# Patient Record
Sex: FEMALE | Race: BLACK OR AFRICAN AMERICAN
[De-identification: names, ages, dates, MRNs, and addresses within clinical notes are randomized per-mention and may not be internally consistent; named-entity substitution may affect disease eponyms.]

---

## 2017-04-15 ENCOUNTER — HOSPITAL ENCOUNTER (EMERGENCY)
Dept: HOSPITAL 25 - ED | Age: 57
Discharge: HOME | End: 2017-04-15
Payer: COMMERCIAL

## 2017-04-15 VITALS — SYSTOLIC BLOOD PRESSURE: 136 MMHG | DIASTOLIC BLOOD PRESSURE: 76 MMHG

## 2017-04-15 DIAGNOSIS — Z87.891: ICD-10-CM

## 2017-04-15 DIAGNOSIS — N39.0: Primary | ICD-10-CM

## 2017-04-15 PROCEDURE — 87186 SC STD MICRODIL/AGAR DIL: CPT

## 2017-04-15 PROCEDURE — 99282 EMERGENCY DEPT VISIT SF MDM: CPT

## 2017-04-15 PROCEDURE — 81015 MICROSCOPIC EXAM OF URINE: CPT

## 2017-04-15 PROCEDURE — 36415 COLL VENOUS BLD VENIPUNCTURE: CPT

## 2017-04-15 PROCEDURE — 87077 CULTURE AEROBIC IDENTIFY: CPT

## 2017-04-15 PROCEDURE — 87086 URINE CULTURE/COLONY COUNT: CPT

## 2017-04-15 PROCEDURE — 81003 URINALYSIS AUTO W/O SCOPE: CPT

## 2017-04-15 PROCEDURE — 86703 HIV-1/HIV-2 1 RESULT ANTBDY: CPT

## 2017-04-15 NOTE — ED
Mu GATES Janilya, scribed for Riley Morales MD on 04/15/17 at 1650 .





GI/ HPI





- HPI Summary


HPI Summary: 


[facility] 57 [facility] w/ a gradual onset of constant UTI-

like Sx starting this morning. Pt reports hematuria, urinary urgency, and 

urinary burning. Nothing makes the pain better or worse. Pt denies abd pain. Pt 

states she has been drinking recently on her birthday celebration.





- History of Current Complaint


Chief Complaint: EDUrogenitalProblems


Time Seen by Provider: 04/15/17 16:33


Stated Complaint: BLOOD IN URINE, DISCOMFORT


Hx Obtained From: Patient


Onset/Duration: Started Hours Ago, Atraumatic, Still Present


Timing: Constant, Lasting Hours


Severity: Moderate


Current Severity: Moderate


Vaginal Bleeding Description: Dark Red


Pain Intensity: 0


Pain Characteristics: Burning


Aggravating Factor(s): Nothing


Alleviating Factor(s): Nothing





- Allergy/Home Medications


Allergies/Adverse Reactions: 


 Allergies











Allergy/AdvReac Type Severity Reaction Status Date / Time


 


No Known Allergies Allergy   Verified 04/15/17 16:14














PMH/Surg Hx/FS Hx/Imm Hx


Previously Healthy: Yes


EENT History: 


   Denies: Hx Deafness


Infectious Disease History: No


Infectious Disease History: 


   Denies: Traveled Outside the [place] in Last 30 Days





- Family History


Known Family History: Positive: Hypertension





- Social History


Alcohol Use: Occasionally


Substance Use Type: Reports: None


Smoking Status (MU): Former Smoker





Review of Systems


Positive: burning, hematuria, urgency


All Other Systems Reviewed And Are Negative: Yes





Physical Exam





- Summary


Physical Exam Summary: 





VITAL SIGNS: Reviewed. 


GENERAL: Patient is a well developed and nourished female who is lying 

comfortable in the stretcher. Patient is not in any acute respiratory distress. 


HEAD AND FACE: Normocephalic and atraumatic. 


EYES: PERRLA, EOMI x 2, No injected conjunctiva.


EARS: Hearing grossly intact. Ear canals and tympanic membranes are WNL.


MOUTH: Oropharynx within normal limits. 


NECK: Supple, trachea is midline, no adenopathy, no JVD.


CHEST: Symmetric, no tenderness at palpation 


LUNGS: Clear to auscultation bilaterally. No wheezing or crackles.


CVS: RRR,, S1 and S2 present, no murmurs or gallops appreciated. 


ABDOMEN: Soft, non-tender. No signs of distention. Positive bowel sounds. No 

rebound no guarding, and no masses palpated. No abdominal bruit or pulsations. 


EXTREMITIES: FROM in all major joints, no edema, no cyanosis or clubbing.


NEURO: Alert and oriented x 3. No acute neurological deficits. Speech is normal.


SKIN: Dry and warm





Triage Information Reviewed: Yes


Vital Signs On Initial Exam: 


 Initial Vitals











Temp Pulse Resp BP Pulse Ox


 


 97.7 F   62   15   142/79   100 


 


 04/15/17 15:00  04/15/17 15:00  04/15/17 15:00  04/15/17 15:00  04/15/17 15:00











Vital Signs Reviewed: Yes





Diagnostics





- Vital Signs


 Vital Signs











  Temp Pulse Resp BP Pulse Ox


 


 04/15/17 16:10  98 F  68  16  139/80  98


 


 04/15/17 15:00  97.7 F  62  15  142/79  100














- Laboratory


Lab Results: 


 Lab Results











  04/15/17 Range/Units





  16:15 


 


Urine Color  Red A  


 


Urine Appearance  Cloudy  


 


Urine pH  5.0  (5-9)  


 


Ur Specific Gravity  1.028  (1.010-1.030)  


 


Urine Protein  2+(100 mg/dl) H  (Negative)  


 


Urine Ketones  Negative  (Negative)  


 


Urine Blood  3+ H  (Negative)  


 


Urine Nitrate  Negative  (Negative)  


 


Urine Bilirubin  Negative  (Negative)  


 


Urine Urobilinogen  Negative  (Negative)  


 


Ur Leukocyte Esterase  2+ H  (Negative)  


 


Urine WBC (Auto)  3+(>20/hpf) H  (Absent)  


 


Urine RBC (Auto)  3+(>10/hpf) H  (Absent)  


 


Ur Squamous Epith Cells  Present H  (Absent)  


 


Urine Bacteria  Absent  (Absent)  


 


Urine Glucose  Negative  (Negative)  











Lab Statement: Any lab studies that have been ordered have been reviewed, and 

results considered in the medical decision making process.





GIGU Course/Dx





- Course


Assessment/Plan: A 57 [facility] w/ a gradual onset 

of constant UTI-like Sx starting this morning. Pt reports hematuria, urinary 

urgency, and urinary burning. Nothing makes the pain better or worse. Pt denies 

abd pain. Pt states she has been drinking recently on her birthday celebration.

  Abnormal urinalysis results.  urine color - red A.  urine protein 2+(100 mg/dl

) H.  urine blood 3+ H.  urine leukocyte esterase 2+ H.  urine WBC 3+(>20/hpf) 

H.  urine RBC 3+(>10/hpf) H.  In the ED course, pt continues to have burning 

urination and hematuria. Thus, I do believe that pt has UTI. Urinalysis shows 

contamination. But we will send urine for culture. Pt will be given Cipro for 

UTI.  Pt is AxOx3 and hemodynamically stable. Pt will be discharged home w/ f/u 

w/ PCP in 2 days.  I discussed all the findings and test results with the 

patient. Patient was instructed to return to the emergency room immediately if 

any of the symptoms return or worsens. Plan of care was discussed with the 

patient and understands and agrees. All questions were answered at patient 

satisfaction.  There were no further complaints or concerns.  Lung exam before 

discharge: CTA B/L. Good air exchange. No wheezing or crackles heard. CVS: S1 

and S2 present. No murmurs appreciated. Patient is alert and oriented x 3. 

Patient is hemodynamically stable. Patient will be discharged home with follow 

up PCP in the next 2-3 days





- Diagnoses


Differential Diagnoses - Female: Urinary Tract Infection, Other


Provider Diagnoses: 


 UTI








Discharge





- Discharge Plan


Condition: Stable


Disposition: HOME


Prescriptions: 


Ciprofloxacin TAB* [Cipro 500 MG TAB*] 500 mg PO BID #6 tab


Patient Education Materials:  Urinary Tract Infection in Women (ED), Dysuria (ED

)


Referrals: 


No Primary Care Phys,NOPCP [Primary Care Provider] - 





The documentation as recorded by the Mu lloyd Janilya accurately 

reflects the service I personally performed and the decisions made by me, Riley Morales MD.

## 2017-04-17 NOTE — PN
Progress Note





- Progress Note


Note: 


Patient placed on cipro for UTI. urine culture grew 25-50,000. will wait for 

final culture.

## 2017-04-18 NOTE — PN
Progress Note





- Progress Note


Note: 


patient placed on cipro which final culture shows is sensitive too. No further 

action needed

## 2017-08-24 ENCOUNTER — HOSPITAL ENCOUNTER (EMERGENCY)
Dept: HOSPITAL 25 - ED | Age: 57
Discharge: HOME | End: 2017-08-24
Payer: COMMERCIAL

## 2017-08-24 VITALS — DIASTOLIC BLOOD PRESSURE: 90 MMHG | SYSTOLIC BLOOD PRESSURE: 171 MMHG

## 2017-08-24 DIAGNOSIS — Z87.891: ICD-10-CM

## 2017-08-24 DIAGNOSIS — R10.9: ICD-10-CM

## 2017-08-24 DIAGNOSIS — M46.1: ICD-10-CM

## 2017-08-24 DIAGNOSIS — M54.9: Primary | ICD-10-CM

## 2017-08-24 PROCEDURE — 81003 URINALYSIS AUTO W/O SCOPE: CPT

## 2017-08-24 PROCEDURE — 81015 MICROSCOPIC EXAM OF URINE: CPT

## 2017-08-24 PROCEDURE — 99282 EMERGENCY DEPT VISIT SF MDM: CPT

## 2017-08-24 PROCEDURE — 87086 URINE CULTURE/COLONY COUNT: CPT

## 2017-08-24 PROCEDURE — 72192 CT PELVIS W/O DYE: CPT

## 2017-08-24 NOTE — ED
Joao GATES Angela, scribed for Gerson Echeverria MD on 08/24/17 at 0953 .





Back Pain





- HPI Summary


HPI Summary: 





This pt is a 58 y[facility]/o constant right sided back 

pain x2 days. Pt reports her pain is aggravated with movement, standing up, 

sitting down for too long, and bending down. She has not taken any pain 

medication. Pt denies abd pain, chest pain, SOB, nausea, vomiting, diarrhea, 

numbness or tingling in lower extremity. She notes she spoke with her nurse and 

was told to come to the ED. Pt reports a PMHx: left sided back injury. 





- History of Current Complaint


Chief Complaint: EDFlankPain


Stated Complaint: RT FLANK PAIN


Time Seen by Provider: 08/24/17 09:36


Hx Obtained From: Patient


Onset/Duration: Lasting Days


Pain Intensity: 9


Pain Scale Used: 0-10 Numeric


Aggravating Symptom(s): Movement


Alleviating Symptom(s): Position


Associated Signs And Symptoms: Positive: Abdominal Pain - discomfort.  Negative

: Weakness, Numbness, Tingling





- Allergies/Home Medications


Allergies/Adverse Reactions: 


 Allergies











Allergy/AdvReac Type Severity Reaction Status Date / Time


 


No Known Allergies Allergy   Verified 08/24/17 09:59














PMH/Surg Hx/FS Hx/Imm Hx


Endocrine/Hematology History: 


   Denies: Hx Diabetes


Sensory History: 


   Denies: Hx Deafness


Infectious Disease History: 


   Denies: Traveled Outside the [place] in Last 30 Days





- Family History


Known Family History: Positive: Hypertension





- Social History


Alcohol Use: Occasionally


Substance Use Type: Reports: None


Smoking Status (MU): Former Smoker





Review of Systems


Negative: Fever, Chills


Negative: Abdominal Pain, Vomiting, Diarrhea, Nausea


Negative: dysuria, hematuria


Positive: Other - Right sided back pain


Negative: Weakness, Numbness


All Other Systems Reviewed And Are Negative: Yes





Physical Exam


Triage Information Reviewed: Yes


Vital Signs On Initial Exam: 


 Initial Vitals











Temp Pulse Resp BP Pulse Ox


 


 97.6 F   61   20   165/95   99 


 


 08/24/17 09:28  08/24/17 09:28  08/24/17 09:28  08/24/17 09:28  08/24/17 09:28











Vital Signs Reviewed: Yes


Appearance: Positive: Well-Appearing, Pain Distress - mild


Skin: Positive: Warm, Skin Color Reflects Adequate Perfusion, Dry


Head/Face: Positive: Normal Head/Face Inspection


Eyes: Positive: Normal


ENT: Positive: Normal ENT inspection


Neck: Positive: Supple, Nontender


Respiratory/Lung Sounds: Positive: Clear to Auscultation, Breath Sounds Present


Cardiovascular: Positive: RRR


Abdomen Description: Positive: Soft, Other: - Mildly tendern on RLQ.


Bowel Sounds: Positive: Present


Musculoskeletal: Positive: Other - Tenderness on right iliac crest. Negative 

straight leg raise bilaterlly.


Neurological: Positive: Normal


Psychiatric: Positive: Normal, Affect/Mood Appropriate





Diagnostics





- Vital Signs


 Vital Signs











  Temp Pulse Resp BP Pulse Ox


 


 08/24/17 09:28  97.6 F  61  20  165/95  99














- Laboratory


Lab Results: 


 Lab Results











  08/24/17 Range/Units





  10:10 


 


Urine Color  Yellow  


 


Urine Appearance  Clear  


 


Urine pH  5.0  (5-9)  


 


Ur Specific Gravity  1.020  (1.010-1.030)  


 


Urine Protein  Negative  (Negative)  


 


Urine Ketones  Trace H  (Negative)  


 


Urine Blood  Negative  (Negative)  


 


Urine Nitrate  Negative  (Negative)  


 


Urine Bilirubin  Negative  (Negative)  


 


Urine Urobilinogen  Negative  (Negative)  


 


Ur Leukocyte Esterase  Trace H  (Negative)  


 


Urine WBC (Auto)  Trace(0-5/hpf)  (Absent)  


 


Urine RBC (Auto)  Trace(0-2/hpf)  (Absent)  


 


Urine Bacteria  Absent  (Absent)  


 


Urine Glucose  Negative  (Negative)  











Lab Statement: Any lab studies that have been ordered have been reviewed, and 

results considered in the medical decision making process.





- CT


  ** Pelvis CT


CT Interpretation: Positive (See Comments) - IMPRESSION: 1. Mild bilateral 

osteoarthritic change in the hips. No evidence for fracture. If the patient's 

symptoms persist, follow-up MR imaging. 2. Mild to moderate bilateral 

sacroilitis. 3. Small periumbilical hernia containing nondistended small bowel.

  ED physician has reviewed this radiology report and agrees.


CT Interpretation Completed By: Radiologist





Re-Evaluation





- Re-Evaluation


  ** First Eval


Re-Evaluation Time: 11:00


Comment: Updated the pt with CT pelvis results.





Back Pain Course/Dx





- Course


Course Of Treatment: Ms. Loja has had a couple of weeks of increasing right 

hip/pelvis pain especially with movement.  She was found to have sacroiliitis 

and treated conservatively.





- Diagnoses


Provider Diagnoses: 


 Sacroiliitis








Discharge





- Discharge Plan


Condition: Stable


Disposition: HOME


Patient Education Materials:  Sacroiliitis (ED)


Forms:  *Work Release


Referrals: 


Alondra Sanchez MD [Primary Care Provider] - 


Additional Instructions: 


Your blood pressure was elevated during today's visit. Please follow up with 

your primary care physician. Recommend ibuprofen for the pain and rest. 





The documentation as recorded by the Joao lloyd Angela accurately reflects 

the service I personally performed and the decisions made by me, Gerson Echeverria MD.

## 2017-08-24 NOTE — RAD
INDICATION:  Right hip pain.



COMPARISON:  There are no prior studies available for comparison.



TECHNIQUE: Contiguous axial sections were obtained through the pelvis without intravenous

or oral contrast. Images were reconstructed in the coronal and sagittal planes.



FINDINGS:  The bones are in normal alignment. No fracture is seen.  There is sclerotic and

erosive change around the symphysis pubis consistent with osteitis pubis. There is also

slight joint space narrowing and sclerosis around the sacroiliac joints consistent with

bilateral sacroiliitis. There is mild bilateral osteoarthritic change in the hips.



No significant enlarged pelvic lymph nodes are seen.



The visualized portion of the small bowel colon appear nondistended. There is a

periumbilical hernia which appears to contain nondistended small bowel. 



No free intraperitoneal air or fluid is seen.



IMPRESSION:  

1. MILD BILATERAL OSTEOARTHRITIC CHANGE IN THE HIPS. NO EVIDENCE FOR FRACTURE. IF THE

PATIENT'S SYMPTOMS PERSIST, FOLLOW-UP MR IMAGING.

2. MILD TO MODERATE BILATERAL SACROILIITIS.

3. SMALL PERIUMBILICAL HERNIA CONTAINING NONDISTENDED SMALL BOWEL.

## 2017-08-28 ENCOUNTER — HOSPITAL ENCOUNTER (OUTPATIENT)
Dept: HOSPITAL 25 - OREAST | Age: 57
Discharge: HOME | End: 2017-08-28
Attending: ORTHOPAEDIC SURGERY
Payer: COMMERCIAL

## 2017-08-28 VITALS — DIASTOLIC BLOOD PRESSURE: 78 MMHG | SYSTOLIC BLOOD PRESSURE: 168 MMHG

## 2017-08-28 DIAGNOSIS — L98.9: Primary | ICD-10-CM

## 2017-08-28 PROCEDURE — 88305 TISSUE EXAM BY PATHOLOGIST: CPT

## 2017-08-28 NOTE — OP
DATE OF OPERATION:  08/28/17 - [facility]

 

DATE OF BIRTH:  [birth year]

 

SURGEON:  Mauro Omalley MD

 

ASSISTANT:  GINI Patel

 

ANESTHESIOLOGIST:  None.

 

ANESTHESIA:  Local only with digital block via 0.5% Marcaine.

 

PRE-OP DIAGNOSIS:  Right middle finger skin lesion, probable retained foreign 
body.

 

POST-OP DIAGNOSIS:  Right middle finger skin lesion, probable retained foreign 
body.

 

OPERATIVE PROCEDURE:  Removal of right index finger skin lesion, probable 
foreign body.

 

INDICATIONS: Zelalem has had a firm sensitive area over the volar radial aspect 
of the right middle finger over the middle phalanx area for quite some time.  
It is bothering her quite a bit.  She came to my office for both hands, but the 
right hand was bothering her more and so, we talked about removing that 
sensitive area and closing it up in an effort to get that sensitivity to go 
away and to get the skin more normal appearing there.  We talked about risks 
and benefits.  She elected to proceed.

 

FINDINGS:  As expected.

 

ESTIMATED BLOOD LOSS:  1 mL.

 

COMPLICATIONS:  None.

 

DESCRIPTION OF PROCEDURE:  Zelalem was seen in the preoperative holding area.  
The correct side, site, and procedure were identified.  We had a time-out and 
then I performed a digital block with 0.5% plain Marcaine.  Short time later, 
we came back to the operating room.  The arm was prepped and draped in the 
usual fashion.  A formal time-out was performed.

 

I began by placing a Tourni-Cot on the right middle finger.  I then ellipsed 
out the involved area.  It was an ellipse of skin that was 6 or 7 mm x 3 mm 
wide.  This was taken down full thickness down to the subcutaneous tissue.  It 
looked like it did not go deep past the dermis and so I went ahead at least to 
the subcutaneous tissue leaving the digital nerve deep to this undisturbed.  
The specimen was then handed off.  I then irrigated out the wound.  The skin 
was closed with 4-0 chromic gut suture.  The wound was dressed with Xeroform, 1-
inch Mayda, and a Coban.  The Tourni-Cot was released.  The finger pinked up 
immediately.  She was then taken to the recovery room in stable condition.

 

 [id number]/CPS #: [id number]

[facility]

## 2018-01-04 ENCOUNTER — HOSPITAL ENCOUNTER (EMERGENCY)
Dept: HOSPITAL 25 - ED | Age: 58
Discharge: HOME | End: 2018-01-04
Payer: COMMERCIAL

## 2018-01-04 VITALS — SYSTOLIC BLOOD PRESSURE: 157 MMHG | DIASTOLIC BLOOD PRESSURE: 91 MMHG

## 2018-01-04 DIAGNOSIS — N39.0: Primary | ICD-10-CM

## 2018-01-04 DIAGNOSIS — R31.9: ICD-10-CM

## 2018-01-04 DIAGNOSIS — F12.90: ICD-10-CM

## 2018-01-04 DIAGNOSIS — Z87.891: ICD-10-CM

## 2018-01-04 PROCEDURE — 99282 EMERGENCY DEPT VISIT SF MDM: CPT

## 2018-01-04 PROCEDURE — 81003 URINALYSIS AUTO W/O SCOPE: CPT

## 2018-01-04 NOTE — ED
Mauricio GATES Stephanie, scribed for Gerson Echeverria MD on 01/04/18 at 1022 .





GI/ HPI





- HPI Summary


HPI Summary: 


The pt is a 56 y/o F presenting to the ED with c/o pain with urination that 

began 2 days ago.  Symptoms include hematuria. The pt denies back pain and 

nausea.  Her pain has currently subsided. 








- History of Current Complaint


Chief Complaint: EDUrogenitalProblems


Time Seen by Provider: 01/04/18 08:55


Stated Complaint: POSSIBLE UTI


Hx Obtained From: Patient


Onset/Duration: Started Days Ago - 2


Timing: Constant


Severity: Mild


Pain Intensity: 0


Location of Pain: Suprapubic


Associated Signs and Symptoms: Positive: Hematuria.  Negative: Back Pain, Nausea


Aggravating Factor(s): Nothing


Alleviating Factor(s): Nothing





- Allergy/Home Medications


Allergies/Adverse Reactions: 


 Allergies











Allergy/AdvReac Type Severity Reaction Status Date / Time


 


No Known Allergies Allergy   Verified 01/04/18 08:50














PMH/Surg Hx/FS Hx/Imm Hx


Endocrine/Hematology History: 


   Denies: Hx Diabetes


Sensory History: 


   Denies: Hx Deafness


Infectious Disease History: No


Infectious Disease History: 


   Denies: Traveled Outside the [place] in Last 30 Days





- Family History


Known Family History: Positive: Hypertension





- Social History


Lives: With Family


Alcohol Use: Weekly


Alcohol Amount: few times a week


Hx Substance Use: Yes


Substance Use Type: Reports: Marijuana


Substance Use Comment - Amount & Last Used: occassional use


Smoking Status (MU): Former Smoker


Type: Cigarettes





Review of Systems


Negative: Fever


Negative: Nausea


Positive: dysuria, hematuria, other - Negative: Back pain


All Other Systems Reviewed And Are Negative: Yes





Physical Exam





- Summary


Physical Exam Summary: 


Appearance: The patient is well-nourished in no acute distress and in no acute 

pain.


 


Skin: The skin is warm and dry and skin color reflects adequate perfusion.


 





HEENT:  The head is normocephalic and atraumatic. The pupils are equal and 

reactive. The conjunctivae are clear and without drainage.  Nares are patent 

and without drainage.  Mouth reveals moist mucous membranes and the throat is 

without erythema and exudate.  The external ears are intact. The ear canals are 

patent and without drainage. The tympanic membranes are intact.


 


Neck: the neck is supple with full range of motion and non-tender. There are no 

carotid bruits.  There is no neck vein distension.


 


Respiratory: Chest is non-tender.  Lungs are clear to auscultation and breath 

sounds are symmetrical and equal.


 


Cardiovascular: Heart is regular rate and rhythm.  There is no murmur or rub 

auscultated.   There is no peripheral edema and pulses are symmetrical and 

equal.


 


Abdomen: The abdomen is soft and non-tender.  There are normal bowel sounds 

heard in all four quadrants and there is no organomegaly palpated.


 


Musculoskeletal: There is no back tenderness noted.  Extremities are non-tender 

with full range of motion.  There is good capillary refill.  There is no 

peripheral edema or calf tenderness elicited.


 


Neurological: Patient is alert and oriented to person, place and time.  The 

patient has symmetrical motor strength in all four extremities.  Cranial nerves 

are grossly intact. Deep tendon reflexes are symmetrical and equal in all four 

extremities.


 


Psychiatric: The patient has an appropriate affect and does not exhibit any 

anxiety or depression.








Triage Information Reviewed: Yes


Vital Signs On Initial Exam: 


 Initial Vitals











Temp Pulse Resp BP Pulse Ox


 


 98.2 F   77   18   168/95   99 


 


 01/04/18 08:25  01/04/18 08:25  01/04/18 08:25  01/04/18 08:25  01/04/18 08:25











Vital Signs Reviewed: Yes





Diagnostics





- Vital Signs


 Vital Signs











  Temp Pulse Resp BP Pulse Ox


 


 01/04/18 08:25  98.2 F  77  18  168/95  99














- Laboratory


Lab Results: 


 Lab Results











  01/04/18 Range/Units





  08:40 


 


Urine Color  Red A  


 


Urine Appearance  Cloudy  


 


Ur Specific Gravity  1.024  (1.010-1.030)  


 


Urine WBC (Auto)  3+(>20/hpf) H  (Absent)  


 


Urine RBC (Auto)  3+(>10/hpf) H  (Absent)  


 


Urine Bacteria  Absent  (Absent)  











Lab Statement: Any lab studies that have been ordered have been reviewed, and 

results considered in the medical decision making process.





GIGU Course/Dx





- Course


Course Of Treatment: Pt will be discharged home with antibiotics. Return to ED 

if symptoms worsen.


Assessment/Plan: Ms. Loja presented with about a day of dysuria.  When she 

got here, she started with gross hematuria.  She denied any systemic symptoms.  

Her U/A showed WBCs and RBCs.  CX will not be available for about 36 hours and 

until then I will treat her with antibiotics as UTI is the most likely 

diagnosis.





- Diagnoses


Provider Diagnoses: 


 UTI (urinary tract infection)








Discharge





- Discharge Plan


Condition: Stable


Disposition: HOME


Prescriptions: 


Nitrofurantoin Monohyd Macro [Macrobid] 100 mg PO BID #10 cap


Phenazopyridine 200 mg (NF) [Pyridium 200 MG tab *] 200 mg PO TID #9 tab


Patient Education Materials:  Urinary Tract Infection in Women (ED)


Referrals: 


Alondra Sanchez MD [Primary Care Provider] - 





The documentation as recorded by the Mauricio lloyd Stephanie accurately reflects 

the service I personally performed and the decisions made by me, Gerson Echeverria MD.

## 2018-05-25 ENCOUNTER — HOSPITAL ENCOUNTER (EMERGENCY)
Dept: HOSPITAL 25 - ED | Age: 58
Discharge: HOME | End: 2018-05-25
Payer: COMMERCIAL

## 2018-05-25 DIAGNOSIS — Z87.891: ICD-10-CM

## 2018-05-25 DIAGNOSIS — R10.30: Primary | ICD-10-CM

## 2018-05-25 PROCEDURE — 99282 EMERGENCY DEPT VISIT SF MDM: CPT

## 2018-05-25 PROCEDURE — 81003 URINALYSIS AUTO W/O SCOPE: CPT

## 2018-05-25 NOTE — ED
Francisco GATES Natalie, scribed for Ena Soto MD on 05/25/18 at 1945 .





Abdominal Pain/Female





- HPI Summary


HPI Summary: 


The patient is a 59 y/o F presenting to the ED c/o cramping low abd pain sudden 

onset PTA. The cramping is described as menstrual cramping, although she has 

been through menopause. The pain is rated 6/10 in severity. The pt denies fevers

, dysuria, and back pain. She thinks that she may have a UTI, which would be 

her third one in the last few months. 





- History of Current Complaint


Chief Complaint: EDAbdPain


Stated Complaint: ABD PAIN


Time Seen by Provider: 05/25/18 19:23


Hx Obtained From: Patient


Onset/Duration: Sudden Onset, Still Present


Timing: Constant


Severity Initially: Mild


Severity Currently: Mild


Pain Intensity: 6


Pain Scale Used: 0-10 Numeric


Location: Diffuse - low abd


Radiates: No


Character: Cramping


Aggravating Factor(s): Nothing


Alleviating Factor(s): Nothing


Associated Signs and Symptoms: Positive: Negative - dysuria.  Negative: Fever, 

Back Pain


Allergies/Adverse Reactions: 


 Allergies











Allergy/AdvReac Type Severity Reaction Status Date / Time


 


No Known Allergies Allergy   Verified 05/25/18 17:18














PMH/Surg Hx/FS Hx/Imm Hx


Endocrine/Hematology History: 


   Denies: Hx Diabetes


Sensory History: 


   Denies: Hx Deafness


EENT History: 


   Denies: Hx Deafness


Infectious Disease History: No


Infectious Disease History: 


   Denies: Traveled Outside the US in Last 30 Days





- Family History


Known Family History: Positive: Hypertension





- Social History


Alcohol Use: Weekly


Alcohol Amount: few times a week


Hx Substance Use: Yes


Substance Use Type: Reports: Marijuana


Substance Use Comment - Amount & Last Used: occassional use


Smoking Status (MU): Former Smoker


Type: Cigarettes





Review of Systems


Negative: Fever


Positive: Abdominal Pain - low


Negative: dysuria


Musculoskeletal: Negative -  back pain


All Other Systems Reviewed And Are Negative: Yes





Physical Exam





- Summary


Physical Exam Summary: 


VITAL SIGNS: Reviewed.


GENERAL: Patient is a well-developed and nourished female who is lying 

comfortable in the stretcher. Patient is not in any acute respiratory distress.


HEAD AND FACE: No signs of trauma. No ecchymosis, hematomas or skull 

depressions. No sinus tenderness.


EYES: PERRLA, EOMI x 2, No injected conjunctiva, no nystagmus.


EARS: Hearing grossly intact. Ear canals and tympanic membranes are within 

normal limits.


MOUTH: Oropharynx within normal limits.


NECK: Supple, trachea is midline, no adenopathy, no JVD, no carotid bruit, no c-

spine tenderness, neck with full ROM.


CHEST: Symmetric, no tenderness at palpation


LUNGS: Clear to auscultation bilaterally. No wheezing or crackles.


CVS: Regular rate and rhythm, S1 and S2 present, no murmurs or gallops 

appreciated.


ABDOMEN: Soft, non-tender. No signs of distention. No rebound no guarding, and 

no masses palpated. Bowel sounds are normal.


EXTREMITIES: FROM in all major joints, no edema, no cyanosis or clubbing.


NEURO: Alert and oriented x 3. No acute neurological deficits. Speech is normal 

and follows commands.


SKIN: Dry and warm


Triage Information Reviewed: Yes


Vital Signs On Initial Exam: 


 Initial Vitals











Temp Pulse Resp BP Pulse Ox


 


 98.0 F   69   20   159/102   98 


 


 05/25/18 17:16  05/25/18 17:16  05/25/18 17:16  05/25/18 17:16  05/25/18 17:16











Vital Signs Reviewed: Yes





Diagnostics





- Vital Signs


 Vital Signs











  Temp Pulse Resp BP Pulse Ox


 


 05/25/18 18:34   62  16  165/98  100


 


 05/25/18 17:16  98.0 F  69  20  159/102  98














- Laboratory


Lab Results: 


 Lab Results











  05/25/18 Range/Units





  19:57 


 


Urine Color  Yellow  


 


Urine Appearance  Clear  


 


Urine pH  5.0  (5-9)  


 


Ur Specific Gravity  1.025  (1.010-1.030)  


 


Urine Protein  Negative  (Negative)  


 


Urine Ketones  Negative  (Negative)  


 


Urine Blood  Negative  (Negative)  


 


Urine Nitrate  Negative  (Negative)  


 


Urine Bilirubin  Negative  (Negative)  


 


Urine Urobilinogen  Negative  (Negative)  


 


Ur Leukocyte Esterase  Negative  (Negative)  


 


Urine Glucose  Negative  (Negative)  











Lab Statement: Any lab studies that have been ordered have been reviewed, and 

results considered in the medical decision making process.





Abdominal Pain Fem Course/Dx





- Course


Course Of Treatment: 58 years old female complains of suprapubic discomfort for 

one to 2 days, physical exam is unremarkable, urinalysis is negative.  Patient 

will be treated symptomatically with Tylenol and Motrin for pain.  Patient to 

be discharged home to [facility] with private [facility].  This





- Diagnoses


Provider Diagnoses: 


 Suprapubic pain








Discharge





- Sign-Out/Discharge


Documenting (check all that apply): Discharge/Admit/Transfer - Discharge





- Discharge Plan


Condition: Stable


Disposition: HOME


Patient Education Materials:  Abdominal Pain (ED)


Referrals: 


Alondra Sanchez MD [Primary Care Provider] - 


Additional Instructions: 





RETURN TO EMERGENCY DEPARTMENT FOR ANY NEW OR WORSENING SYMPTOMS





- Billing Disposition and Condition


Condition: STABLE


Disposition: HOME





The documentation as recorded by the Francisco lloyd Natalie accurately reflects 

the service I personally performed and the decisions made by Brittany stoddard Abdul, MD.

## 2019-02-18 ENCOUNTER — HOSPITAL ENCOUNTER (EMERGENCY)
Dept: HOSPITAL 25 - ED | Age: 59
Discharge: HOME | End: 2019-02-18
Payer: COMMERCIAL

## 2019-02-18 DIAGNOSIS — B34.9: Primary | ICD-10-CM

## 2019-02-18 DIAGNOSIS — Z87.891: ICD-10-CM

## 2019-02-18 LAB
FLUAV RNA SPEC QL NAA+PROBE: NEGATIVE
FLUBV RNA SPEC QL NAA+PROBE: NEGATIVE

## 2019-02-18 PROCEDURE — 99281 EMR DPT VST MAYX REQ PHY/QHP: CPT

## 2019-02-18 NOTE — ED
Influenza-Like Illness





- HPI Summary


HPI Summary: 


Patient is a 58-year-old otherwise healthy female presenting to the ED with 2 

day history of fatigue, cough, congestion and body aches.  She denies any cough 

with production.  She endorses a dry cough.  She denies any fevers, sweats, 

chills.  She denies any abdominal pain, nausea, vomiting, constipation, 

diarrhea.  She states she would like to sleep and is requesting a note for 

work.  Patient has no cardiac history.  Denies any COPD history.  Is not 

diabetic.








- History of Current Complaint


Chief Complaint: EDFluSymptoms


Time Seen by Provider: 02/18/19 10:58


Hx Obtained From: Patient


Onset/Duration: Sudden Onset


Severity: Moderate


Associated Signs & Symptoms: Cough, Nasal Congestion





- Risk Factors


Influenza Risk Factors: Negative





- Allergy/Home Medications


Allergies/Adverse Reactions: 


 Allergies











Allergy/AdvReac Type Severity Reaction Status Date / Time


 


No Known Allergies Allergy   Verified 02/18/19 09:43














PMH/Surg Hx/FS Hx/Imm Hx


Previously Healthy: Yes


Endocrine/Hematology History: 


   Denies: Hx Diabetes


Sensory History: 


   Denies: Hx Deafness





- Immunization History


Hx Pertussis Vaccination: No


Immunizations Up to Date: Yes


Infectious Disease History: No


Infectious Disease History: 


   Denies: Traveled Outside the US in Last 30 Days





- Family History


Known Family History: Positive: Hypertension





- Social History


Occupation: Employed Full-time


Lives: With Family


Alcohol Use: None


Alcohol Amount: few times a week


Hx Substance Use: Yes


Substance Use Type: Reports: None


Substance Use Comment - Amount & Last Used: occassional use


Smoking Status (MU): Former Smoker


Type: Cigarettes


Have You Smoked in the Last Year: No





Review of Systems


Constitutional: Negative


Negative: Fever, Chills, Fatigue, Skin Diaphoresis


Negative: Palpitations, Chest Pain


Positive: Cough.  Negative: Shortness Of Breath


Negative: Arthralgia


Negative: Rash, Bruising


Positive: Headache


Psychological: Normal


All Other Systems Reviewed And Are Negative: Yes





Physical Exam


Triage Information Reviewed: Yes


Vital Signs On Initial Exam: 


 Initial Vitals











Temp Pulse Resp BP Pulse Ox


 


 98.4 F   66   20   108/64   97 


 


 02/18/19 09:39  02/18/19 09:39  02/18/19 09:39  02/18/19 09:39  02/18/19 09:39











Vital Signs Reviewed: Yes


Appearance: Positive: Well-Appearing, Well-Nourished


Skin: Positive: Warm, Skin Color Reflects Adequate Perfusion


Eyes: Positive: EOMI, MERY, Conjunctiva Clear


Neck: Positive: Supple, No Lymphadenopathy


Respiratory/Lung Sounds: Positive: Clear to Auscultation, Breath Sounds Present


Cardiovascular: Positive: RRR, Pulses are Symmetrical in both Upper and Lower 

Extremities


Musculoskeletal: Positive: Normal, Strength/ROM Intact


Neurological: Positive: Speech Normal


Psychiatric: Positive: Affect/Mood Appropriate





Diagnostics





- Vital Signs


 Vital Signs











  Temp Pulse Resp BP Pulse Ox


 


 02/18/19 11:38  0 F  0  0  00/00  0


 


 02/18/19 09:39  98.4 F  66  20  108/64  97














- Laboratory


Lab Results: 


 Lab Results











  02/18/19 Range/Units





  09:56 


 


Influenza A (Rapid)  Negative  (Negative)  


 


Influenza B (Rapid)  Negative  (Negative)  











Lab Statement: Any lab studies that have been ordered have been reviewed, and 

results considered in the medical decision making process.





Flu Symptom Course/Dx





- Course


Course Of Treatment: On physical examination, patient appears well.  

Nondiaphoretic is nontoxic appearing.  CTA.  RRR.  Influenza negative.  

Discussed treatment options with patient.  She states NyQuil has been relieving 

her symptoms at home and she is only requesting a note for work.  She is given 

a note for work and I have encouraged fluids and rest.





- Diagnoses


Differential Diagnosis/HQI/PQRI: Positive: Other - Influenza, viral illness, 

headache, fatigue


Provider Diagnoses: 


 Viral illness








Discharge





- Sign-Out/Discharge


Documenting (check all that apply): Patient Departure


Patient Received Moderate/Deep Sedation with Procedure: No





- Discharge Plan


Condition: Stable


Disposition: HOME


Patient Education Materials:  Viral Syndrome (ED)


Forms:  *Work Release


Referrals: 


Alondra Sanchez MD [Primary Care Provider] - 


Additional Instructions: 


Drink plenty of fluids


NyQuil and DayQuil as needed


Rest as much as possible





- Billing Disposition and Condition


Condition: STABLE


Disposition: Home

## 2019-02-18 NOTE — XMS REPORT
Continuity of Care Document (CCD)

 Created on:2019



Patient:Zelalem Loja

Sex:Female

:1960

External Reference #:[id number]





Demographics







 Address  [address] [place] [address] [address]



   [place] [address]

 

 Mobile Phone  [phone]

 

 Email Address  [email or web address]

 

 Preferred Language  en

 

 Marital Status  Not  or 

 

 Jainism Affiliation  Unknown

 

 Race  Black / 

 

 Ethnic Group  Not  or 









Author







 Name  Sofiya Rivera









Support







 Name  Relationship  Address  Phone

 

 Flor Aragon  Unavailable  Unavailable  [phone]

 

 Ace Zhang  Unavailable  Unavailable  [phone]









Care Team Providers







 Name  Role  Phone

 

 Alondra Sanchez MD  Primary Care Physician  Unavailable









Payers







 Date  Identification Numbers  Payment Provider  Subscriber

 

   Policy Number: [id number]  [employer]  Zelalem Loja









 PayID: [id number]  [address] [address]









 [place] [address]







Advance Directives







 Description

 

 No Information Available







Problems







 Date  Description  Provider  Status

 

 Onset: 2017  Localized superficial swelling of skin  Mauro Omalley MD  
Active







Family History







 Date  Family Member(s)  Observation  Comments

 

   General  Cancer  

 

   Father  Prostate Cancer  

 

   Mother  Hypertension  







Social History







 Type  Date  Description  Comments

 

 Birth Sex    Unknown  

 

 Marital Status      

 

 Lives With    Daughter  

 

 Occupation    Home Health Aide  

 

 ETOH Use    Currently consumes alcohol  

 

 Tobacco Use  Start: Unknown End:  Patient is a former smoker  



   Unknown    

 

 Smoking Status  Reviewed: 19  Patient is a former smoker  

 

 Exercise Type/Frequency    Exercises regularly  







Allergies, Adverse Reactions, Alerts







 Description

 

 No Known Drug Allergies







Medications







 Medication  Date  Status  Form  Strength  Qnty  SIG  Indications  Ordering



                 Provider

 

 Hydrochlorothiazide  /  Active  Tablets  12.5mg    1 tab    Hal,



   0000          daily    MD Alondra

 

                 

 

 Tramadol  /  Hx  Tablets  37.5-325mg  30tab  1-2 tab    Mauro



 Hydrochloride/Acetami   -        s  by    kia Omalley  /          mouth    MD



   2018          every    



             4-6    



             hours    



             as    



             needed    



             for    



             postop    



             pain    

 

 No Active Medications    Hx            Unknown



    -              



   2017              







Immunizations







 Description

 

 No Information Available







Vital Signs







 Date  Vital  Result  Comment

 

 2019  1:05pm  Height  65 inches  5'5"









 Weight  206.00 lb  

 

 Heart Rate  78 /min  

 

 BP Systolic Sitting  140 mmHg  

 

 BP Diastolic Sitting  100 mmHg  

 

 Respiratory Rate  18 /min  

 

 Body Temperature  97.7 F  

 

 BMI (Body Mass Index)  34.3 kg/m2  









 05/15/2018  1:36pm  Height  65 inches  5'5"









 Heart Rate  63 /min  

 

 BP Systolic  130 mmHg  

 

 BP Diastolic  82 mmHg  

 

 Respiratory Rate  16 /min  

 

 Body Temperature  98.1 F  

 

 Pain Level  8  









 2018  8:21am  Height  65 inches  5'5"









 Weight  198.00 lb  

 

 Heart Rate  72 /min  

 

 Respiratory Rate  14 /min  

 

 Pain Level  10  

 

 BMI (Body Mass Index)  32.9 kg/m2  









 2018 11:13am  Height  65 inches  5'5"









 Weight  190.00 lb  

 

 Heart Rate  72 /min  

 

 Respiratory Rate  14 /min  

 

 Body Temperature  98.0 F  

 

 Pain Level  10  

 

 BMI (Body Mass Index)  31.6 kg/m2  









 2018  8:07am  Height  65 inches  5'5"









 Heart Rate  73 /min  

 

 BP Systolic  132 mmHg  

 

 BP Diastolic  84 mmHg  

 

 Respiratory Rate  16 /min  

 

 Body Temperature  98.0 F  

 

 Pain Level  3  









 2018 11:27am  Height  65 inches  5'5"









 Heart Rate  18 /min  

 

 BP Systolic  142 mmHg  

 

 BP Diastolic  72 mmHg  

 

 Respiratory Rate  18 /min  

 

 Body Temperature  98.6 F  

 

 Pain Level  10  









 2017 10:00am  Height  65 inches  5'5"









 Weight  198.00 lb  

 

 Heart Rate  72 /min  

 

 Respiratory Rate  16 /min  

 

 Body Temperature  97.4 F  

 

 Pain Level  10  

 

 BMI (Body Mass Index)  32.9 kg/m2  









 09/15/2017  1:07pm  Height  65 inches  5'5"









 Weight  195.00 lb  

 

 Heart Rate  75 /min  

 

 BP Systolic Sitting  130 mmHg  

 

 BP Diastolic Sitting  90 mmHg  

 

 Body Temperature  97.8 F  

 

 BMI (Body Mass Index)  32.4 kg/m2  









 2017  4:21pm  Height  65 inches  5'5"









 Weight  199.00 lb  

 

 BMI (Body Mass Index)  33.1 kg/m2  









 2017  2:35pm  Height  65 inches  5'5"









 Weight  199.00 lb  

 

 Heart Rate  61 /min  

 

 BP Systolic  170 mmHg  

 

 BP Diastolic  95 mmHg  

 

 Body Temperature  96.5 F  

 

 BMI (Body Mass Index)  33.1 kg/m2  







Results







 Test  Date  Facility  Test  Result  H/L  Range  Note

 

 Laboratory test  2017  [facility]  Surgical  SEE RESULT      1
, 2



 finding    [address]  Pathology  BELOW      



     [place] [id number] [phone]          









 1  [id number]

 

 2  SEE RESULT BELOW



   -----------------------------------------------------------------------------
---------------



   Name:  ZELALEM LOJA                   : 1960    Attend Dr: Mauro Omalley MD



   Acct:  [id number]  Unit: [id number]  AGE: 57            Location:  [facility]



   Re17                        SEX: F             Status:    DEP SDC



   -----------------------------------------------------------------------------
---------------



   



   SPEC: [id number]             NUNU: 17-50      [facility] DR: Mauro Omalley MD



   REQ:  [id number]             RECD: 



   STATUS: SOUT



   _



   ORDERED:  LEVEL 4



   COMMENTS: [id number]



   



   FINAL DIAGNOSIS



   



   



   Skin, right middle finger, biopsy:



   -- Verruca vulgaris.



   



   



   



   PRE-OPERATIVE DIAGNOSIS



   



   Skin lesion right middle finger.



   



   GROSS DESCRIPTION



   



   The specimen is received in formalin labeled, Right Middle Finger Skin Lesion
, Probable



   Foreign Body, and consists of a 0.8 x 0.4 cm tan unoriented volar skin 
ellipse excised to a



   depth of 0.3 cm with a central 0.1 cm tan scabrous lesion.  The specimen is 
inked, trisected



   and submitted entirely in one cassette.



   



   Signed __________(signature on file)___________ Gina Russell MD  1004



   



   -----------------------------------------------------------------------------
---------------



   



   



   



   



   



   



   



   



   



   



   



   



   



   



   ** END OF REPORT **



   



   * ML=Testing performed at Main Lab



   DEPARTMENT OF PATHOLOGY,  [address] [place] [address]



   Phone # [phone]      Fax #[phone]



   Jareth Low M.D. Director     [facility] # [id number]







Procedures







 Date  Code  Description  Status

 

 2019  [id number]  Mammogram  Completed

 

 2019  [id number]  Mammogram  Completed

 

 2018  [id number]  Mammogram  Completed

 

 2017  85749  Excision, Benign Lesion Scalp Neck Hands Feet Genitalia  
Completed



     0.5 Or Le  

 

 2017  87400  Excision, Benign Lesion Scalp Neck Hands Feet Genitalia  
Completed



     0.5 Or Le  

 

 2017  [id number]  Mammogram  Completed







Encounters







 Type  Date  Location  Provider  Dx  Diagnosis

 

 Office Visit  05/15/2018  Orthopedic Services  MELVI Bahena  Medial



   1:15p  Of LING DONIS    epicondylvenkat,



           right elbow









 MKarina.12  Lateral epicondylvenkat, left elbow









 Office Visit  2018  8:30a  Orthopedic  Mauro MAYO  Hallux valgus



     Services Of  LISSA Parikh    (acquired), right



     C.M.A.      foot

 

 Office Visit  2018 11:15a  Orthopedic  Mauro OGDEN  Medial



     Services Of  MD christian Omalley,



     C.M.A.      right elbow

 

 Office Visit  2018  8:30a  Orthopedic  Mauro MAYO  Hallux valgus



     Services Of  LISSA Parikh    (acquired), right



     C.M.A.      foot

 

 Office Visit  2018 10:45a  Orthopedic  Mauro DUARTE  Lateral



     Services Of  MD christian Omalley



     C.M.A.      left elbow

 

 Office Visit  2018  8:15a  Orthopedic  Mauro MAYO  Hallux valgus



     Services Of  LISSA Parikh    (acquired), right



     C.M.A.      foot

 

 Office Visit  2017  9:30a  Orthopedic  Mauro DUARTE  Lateral



     Services Of  MD christian Omalley,



     C.M.A.      left elbow

 

 Office Visit  09/15/2017  1:30p  Orthopedic  Mauro  M20.11  Hallux valgus



     Services Of  LISSA Parikh    (acquired), right



     C.M.A.      foot

 

 Office Visit  2017  2:30p  Orthopedic  Mauro  R22.31  Localized



     Services Of  MD Lyssa    swelling, mass



     C.M.A.      and lump, right



           upper limb









 R22.32  Localized swelling, mass and lump, left upper limb







Plan of Treatment

2019 - Florencia Orantes, [facility].911 Malignant neoplasm of unspecified site of 
right female breasNew Xrays:MRI Breast Bilateral W/Wo [id number] - , Ordered: Referral:Ledy Wisdom MD, Hematology &amp; Oncology

## 2019-03-08 ENCOUNTER — HOSPITAL ENCOUNTER (OUTPATIENT)
Dept: HOSPITAL 25 - OR | Age: 59
Discharge: HOME | End: 2019-03-08
Attending: SURGERY
Payer: COMMERCIAL

## 2019-03-08 VITALS — DIASTOLIC BLOOD PRESSURE: 86 MMHG | SYSTOLIC BLOOD PRESSURE: 142 MMHG

## 2019-03-08 DIAGNOSIS — I10: ICD-10-CM

## 2019-03-08 DIAGNOSIS — C50.911: Primary | ICD-10-CM

## 2019-03-08 DIAGNOSIS — Z87.891: ICD-10-CM

## 2019-03-08 PROCEDURE — C1788 PORT, INDWELLING, IMP: HCPCS

## 2019-03-08 PROCEDURE — 76000 FLUOROSCOPY <1 HR PHYS/QHP: CPT

## 2019-03-08 PROCEDURE — 71045 X-RAY EXAM CHEST 1 VIEW: CPT

## 2019-03-08 NOTE — OP
DATE OF OPERATION:  03/08/19 - ROOM #435

 

DATE OF BIRTH:  [birth year]

 

SERVICE:  General Surgery.

 

SURGEON:  Florencia Orantes MD

 

ASSISTANT:  None.

 

ANESTHESIOLOGIST:  Dr. Kiel Light.

 

PRE-OP DIAGNOSIS:  Right breast cancer.

 

POST-OP DIAGNOSIS:  Right breast cancer.

 

OPERATIVE PROCEDURE:  Placement of right internal jugular 8-Angolan Bard 
PowerPort 

 

INDICATIONS FOR SURGERY: Ms. Loja is a 58-year-old female with a history of 
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 
adjuvant chemotherapy and presents today for a PowerPort placement.  She 
understands the risks, benefits, and alternatives of the procedure and she 
wished to proceed.

 

DESCRIPTION OF PROCEDURE:  The patient was brought back to the operating room 
and placed on the operating table in supine position.  Sequential compression 
devices were placed in the bilateral lower extremities for DVT prophylaxis.  
Antibiotics were administered prior to incision.  The patient underwent MAC 
anesthesia and the bilateral neck was prepped and draped in normal sterile 
fashion.  Prior to beginning the procedure, time-out was performed verifying 
the patient's name, MR number, and the procedure to be performed.  The right 
internal jugular vein was identified and accessed under ultrasound guidance.  
Once venous blood was aspirated through the finder needle, a guidewire was 
advanced under fluoroscopy into the superior vena cava and into the right 
atrium.  After this was done, an incision was made on the right chest wall 
approximately 2 fingerbreadths below the clavicle.  The skin was divided down 
to the subcutaneous tissue and a pocket was made for the placement of the port.
  Once this was done, a subcutaneous tunnel was made from the port site up into 
the neck where the wire was coming out and the PowerPort catheter was tunnelled 
through the subcutaneous plane with a Mosquito at the end of the clamp.  Once 
this was done, the introducer and the peel-away sheath was placed over the 
guidewire into the neck under fluoroscopy, again confirming that it was in the 
correct location.  Once this was done, the wire and the introducer were then 
removed and then an 8-Angolan catheter was then placed into the peel-away sheath 
and while it was being peeled away, it was advanced through the sheath and into 
the superior vena cava and into the atrium.  Once this was done, fluoroscopy 
confirmed that it was in the correct location and the catheter was pulled back 
until it was approximately at the cavoatrial junction.  Once this was done, the 
catheter was approximated, was cut to size at approximately 25 cm and it was 
attached to the PowerPort.  The PowerPort was then placed into the subcutaneous 
pocket.  There was a small kink in the tubing that was straightened out by 
making the pocket little bit deeper and placing the port further inferiorly 
into the pocket. Once this was done, 3-0 Prolene was used to secure the port to 
the chest wall and the port was also accessed using saline to ensure that it 
was able to be infused and aspirated from very easily.  Once this was confirmed
, subdermal stitches were placed with use of 3-0 Vicryl suture into the 
incision of the chest wall.  Again, a Mcleod needle was placed into the port 
through the skin, again confirming that the port could be easily accessed, 
aspirated from and flushed.  After this was done, a running 4-0 Monocryl suture 
was used to close the skin and the nick at the incision at the neck where the 
finder needle had gone through the skin.  After this was done, a Mcleod needle 
was then again placed into the port to allow for her to have chemotherapy later 
on today and sterile dressing was placed over the incision.  The patient's 
anesthesia was reversed and she was taken to the PACU in stable condition.  At 
the end of case, all counts were correct and I was present during the entirety 
of the case.  A postprocedure chest x-ray confirmed that there was no 
pneumothorax on the right side and that the catheter was in place.

 

 [id number]/CPS #: [id number]

MTDD

## 2019-07-10 NOTE — HP
CC:  Dr. Alondra Sanchez; Dr. Rip Eller *

 

DATE OF ADMISSION:  2019 - [facility]

 

History and physical performed on 2019.

 

ATTENDING PHYSICIAN:  Dr. Florencia Orantes * (GINI Witt dictating).

 

CHIEF COMPLAINT:  Right breast cancer.

 

HISTORY OF PRESENT ILLNESS:  This is a 59-year-old female who is BRCA1 positive
, who had undergone mammography on 2018 showing a small asymmetry in the 
right breast.  She states that she had been having regular mammograms.  Part 
her history is obtained from her chart record and Dr. Eller's consultation.  A 
repeat mammogram on 2019 showed progression of a density at the 6 o'clock 
position of the right breast with ultrasound showing a mass measuring 1.2 x 1.1 
x 1.6 cm. Subsequent biopsy on 2019 showed invasive ductal carcinoma 
which was ER/VT and HER2/xavier negative. She also underwent bilateral breast MRI 
which showed a mildly prominent lymph node in the right axilla.  Per the patient
, a subsequent biopsy of that lymph node was negative.   The patient had 
placement of a PowerPort on 2019 by Dr. Orantes and has undergone neoadjuvant 
chemotherapy, her last dose being 19. She has tolerated it fairly well 
overall; her main complaint being fatigue.  Her family history is strongly 
positive for breast cancer in that two sisters have  from breast cancer and 
another sister is recovering from breast cancer, and multiple maternal aunts 
with presumed breast cancer.  She was seen in the office most recently by Dr. Orantes on 2019.  Dr. Orantes has discussed with her the indications for surgery, 
the risks, benefits and alternatives, and she would like to proceed as 
scheduled with bilateral mastectomies with bilateral sentinel lymph node 
biopsy.  Dr. Orantes did discuss with her the possibility of reconstruction and at 
this point the patient is declining.

 

PAST MEDICAL HISTORY:  Hypertension, chronic leukopenia (with negative prior 
work- up).

 

PAST SURGICAL HISTORY:  Her only previous surgery is a PowerPort placement.

 

CURRENT MEDICATIONS:  Hydrochlorothiazide 12.5 mg once daily.

 

DRUG ALLERGIES:  None known.

 

FAMILY HISTORY:  Positive for breast cancer as noted above.  Negative for 
ovarian cancer.  No family history of anesthesia problems, bleeding or clotting 
disorder.

 

SOCIAL HISTORY:  The patient is , she has four children.  She has worked 
as a home health aide.  She quit smoking in the .  She denies use of 
alcohol or other recreational drugs.

 

REVIEW OF SYSTEMS:  General:  No recent constitutional symptoms or acute 
illnesses other than described above in the HPI.  HEENT:  No problems reported. 
Cardiovascular:  No chest pain, palpitations, or heart murmur.  She is treated 
for hypertension.  Respiratory:  No history of asthma, chronic cough, or 
shortness of breath.  GI:  She does have some GERD symptoms which are well-
controlled.  She underwent colonoscopy in  with no interval symptoms of 
concern.  :  No problems reported.  GYN:  She is planning prophylactic 
oophorectomy at some point following her breast surgery.  Endocrine:  No 
diabetes or thyroid dysfunction. Remainder of review of systems is negative.

 

                               PHYSICAL EXAMINATION

 

GENERAL:  Well-nourished, somewhat obese, -American female in no acute 
distress.

 

SKIN:  Warm and dry.  No suspicious rashes or lesions.

 

VITAL SIGNS:  Height 5'5", weight 201 pounds, BMI 33.4.  Blood pressure 128/80, 
pulse 72, respirations 16.

 

HEENT:  Pupils equal and round, reactive.  EOM's intact.  No conjunctival 
pallor. Oropharynx:  Teeth in good repair.  She does have a partial upper and 
lower removable implant.

 

NECK:  No lymphadenopathy, thyromegaly, or masses.

 

LUNGS:  Clear to auscultation.  No rales or wheezes.  PowerPort present in the 
right anterior chest wall.

 

HEART:  Regular rate and rhythm.  No murmur noted.

 

BREASTS:  Not re-examined today.  (Per Dr. Eller's notes, the lesion in the 
right breast which had been palpable in late January was no longer palpable on 
most recent exam.)

 

ABDOMEN:  Soft, nontender to palpation.  No palpable masses or organomegaly.

 

BACK:  No spinous process or CVA tenderness.

 

EXTREMITIES:  No edema.

 

GENITALIA:  Not done.

 

RECTAL:  Not done.

 

NEUROLOGIC:  Grossly intact.

 

 IMPRESSION:  Right breast cancer with desire with for prophylactic left 
mastectomy.

 

PLAN:  Bilateral mastectomies with bilateral sentinel lymph node biopsy; 
removal of PowerPort (per Dr. Eller).

 

 ____________________________________ GINI WITT

 

[id number]/[facility] #: [id number]

[facility]

## 2019-08-01 ENCOUNTER — HOSPITAL ENCOUNTER (INPATIENT)
Dept: HOSPITAL 25 - OR | Age: 59
LOS: 1 days | Discharge: HOME | DRG: 362 | End: 2019-08-02
Attending: SURGERY | Admitting: SURGERY
Payer: COMMERCIAL

## 2019-08-01 DIAGNOSIS — I10: ICD-10-CM

## 2019-08-01 DIAGNOSIS — D72.819: ICD-10-CM

## 2019-08-01 DIAGNOSIS — Z87.891: ICD-10-CM

## 2019-08-01 DIAGNOSIS — C50.911: Primary | ICD-10-CM

## 2019-08-01 DIAGNOSIS — Z80.3: ICD-10-CM

## 2019-08-01 DIAGNOSIS — Z92.21: ICD-10-CM

## 2019-08-01 DIAGNOSIS — K21.9: ICD-10-CM

## 2019-08-01 DIAGNOSIS — Z40.01: ICD-10-CM

## 2019-08-01 DIAGNOSIS — E66.9: ICD-10-CM

## 2019-08-01 PROCEDURE — 07B50ZX EXCISION OF RIGHT AXILLARY LYMPHATIC, OPEN APPROACH, DIAGNOSTIC: ICD-10-PCS | Performed by: SURGERY

## 2019-08-01 PROCEDURE — A9541 TC99M SULFUR COLLOID: HCPCS

## 2019-08-01 PROCEDURE — 05PY33Z REMOVAL OF INFUSION DEVICE FROM UPPER VEIN, PERCUTANEOUS APPROACH: ICD-10-PCS | Performed by: SURGERY

## 2019-08-01 PROCEDURE — 0HTV0ZZ RESECTION OF BILATERAL BREAST, OPEN APPROACH: ICD-10-PCS | Performed by: SURGERY

## 2019-08-01 PROCEDURE — 0JPT0WZ REMOVAL OF TOTALLY IMPLANTABLE VASCULAR ACCESS DEVICE FROM TRUNK SUBCUTANEOUS TISSUE AND FASCIA, OPEN APPROACH: ICD-10-PCS | Performed by: SURGERY

## 2019-08-01 PROCEDURE — 78195 LYMPH SYSTEM IMAGING: CPT

## 2019-08-01 PROCEDURE — 07B60ZX EXCISION OF LEFT AXILLARY LYMPHATIC, OPEN APPROACH, DIAGNOSTIC: ICD-10-PCS | Performed by: SURGERY

## 2019-08-01 NOTE — OP
CC:  Dr. Rip Eller; Dr. Alondra Sanchez *

 

DATE OF OPERATION:  08/01/19 - ROOM #331

 

DATE OF BIRTH:  [birth year]

 

SERVICE:  General Surgery.

 

ATTENDING SURGEON:  Florencia Orantes MD

 

ASSISTANT:  Kassandra Shah MD

 

ANESTHESIOLOGIST:  Dr. Angelo Red.

 

ANESTHESIA:  General endotracheal anesthesia.

 

PRE-OP DIAGNOSIS:  Right breast triple negative invasive ductal adenocarcinoma.

 

POST-OP DIAGNOSIS:  Right breast triple negative invasive ductal adenocarcinoma.

 

OPERATIVE PROCEDURE:  Bilateral mastectomies, bilateral sentinel lymph node 
biopsy, removal of right internal jugular vein port.

 

[place] lymph node biopsy:  Bilateral lymph node mapping performed with 0.646 
mCi of sulfur colloid injected into the right and left periareolar areas.

 

SPECIMENS:  Right breast, left breast, right breast sentinel lymph nodes 
numbers 1, 2, 3 and 4 and left breast sentinel lymph nodes numbers 1, 2 and 3, 
PowerPort.

 

ESTIMATED BLOOD LOSS:  Approximately 50 cc.

 

COMPLICATIONS:  None.

 

DRAINS:  LAURA drain x2.

 

INDICATIONS FOR SURGERY:  Ms. Loja is a very pleasant 59-year-old female with 
a history of hypertension, who was found to have triple-negative invasive 
ductal adenocarcinoma of the right breast earlier this year.  She also was 
diagnosed with BRCA1.  She underwent neoadjuvant chemotherapy and last 
underwent chemotherapy at the end of June.  She therefore presented for a right 
breast mastectomy with sentinel lymph node biopsy as well as prophylactic left 
breast mastectomy with sentinel lymph node biopsy.  Given that she has also 
finished with neoadjuvant chemotherapy, she would not be undergoing any 
adjuvant therapy that required a port.  She requested that this be removed at 
the same time as operation.  She understood the risks, benefits, and 
alternatives of the procedure and she wished to proceed.

 

DESCRIPTION OF PROCEDURE:  The patient was brought back to the operating room 
and placed on the operating table in supine position.  Sequential compression 
devices were placed in the bilateral lower extremities for DVT prophylaxis.  
Antibiotics were administered. General endotracheal anesthesia was induced.  A 
Munson catheter was placed.  The patient's arms were placed at a 90-degree angle 
on two armboards with all pressure points padded and her chest wall and 
bilateral axillas as well as her upper neck were prepped and draped in normal 
sterile fashion.  Prior to beginning the surgery, a time-out was performed 
verifying the patient's name, MR number, and the procedure to be performed.  
Given that the malignancy was located on the right side, attention was turned 
towards first performing on the right mastectomy and sentinal lymph node 
biopsy.  Prior to beginning an oval-shaped incision was marked out on both 
breasts around the areolas.  On the right side, the superior incision was made 
through the skin down to the subcutaneous tissue and then Farida clamps were 
used to elevate the subdermal layer.  With great care, the superior skin flap 
was developed taking care to remove all the breast tissue up towards the 
superior extent which was the clavicle.  At this time, the right-sided port was 
also identified and the two sutures that anchored the port were divided and the 
pocket within was opened up and the port was removed without difficulty.  
Pressure was placed at the internal jugular vein for approximately 2 minutes 
and the port was carried off as a specimen.  The superior flap was carried 
medially towards the lateral edge of the sternum and laterally towards the 
latissimus. Next, the inferior flap was developed down to the intramammary fold 
and also medially towards the sternum and laterally towards the latissimus 
muscle.  Once these borders were developed and the inferior and superior flaps 
were developed, the breast was taken off of the chest wall ensuring that the 
fascia of the pectoralis major muscle was also removed with it.  Once the right 
breast specimen was excised, it was marked with a short suture at its superior 
border, medium length suture at the medial border and long suture at the 
lateral border.  During the dissection, some of the axillary contents were also 
removed with a specimen and the sentinel lymph node biopsy was performed.  The 
first sentinel lymph node was identified using a [place] counter.  The first 
right sentinel lymph node was identified and its in situ count was 2200, the ex-
vivo count was 6002.  The sentinel lymph node #2 in situ count was 495 and the 
sentinel lymph node #2 ex-vivo count was 569.  The sentinel lymph node #3 in 
situ count was 1462 and the sentinel lymph node #3 ex-vivo count was 1313. The 
sentinel lymph node #4 in situ count was 175 and the sentinel lymph node #4 ex- 
vivo count was 171.  After identifying these 4 sentinel lymph nodes on the 
right side, the axillary bed count was 2.  At this point, hemostasis was 
obtained in the right chest cavity, which was irrigated with copious amounts of 
normal saline.  A #10 LAURA drain was placed through the inferior flap and secured 
to the skin using a 3-0 Prolene suture.  A part of the superior flap was 
excised and the dog ears of the incision were also excised to allow for a 
linear closure.  The incision was then closed in two layers, the first layer 
being the subdermal layer, which was closed with 3-0 Vicryl suture and the skin 
was closed using a running 4-0 Monocryl suture.  



Next, attention was turned towards the left breast, which was the side without 
the cancer.  At this point, all the instruments and equipments were changed to 
clean equipment and all gloves were changed as well.  In a similar fashion to 
the right side, an ovoid incision was made around the areola and then the 
attention was turned towards first developing the superior skin flap.  The 
superior skin flap was carried superiorly up towards the clavicle, medially 
towards the lateral border of the sternum and laterally towards the latissimus 
muscle.  The inferior flap was then developed towards inframammary fold and 
then once these borders were created, then the left breast was taken off the 
chest wall using electrocautery and taking care to include the fascia of the 
pectoralis major muscle.  The sentinel lymph node biopsy was performed on the 
left side using the [place] counter to identify the hot nodes.  The sentinel 
lymph node #1 was identified with an in situ count of 3534 and ex-vivo count 
was 2893.  The sentinel lymph node #2 in situ count was 522 and ex-vivo count 
was 563.  The sentinel lymph node #3 in situ count was 219 and ex-vivo count 
was 207.  After obtaining these three sentinel lymph nodes using the navigator, 
the axillary bed count was 3.  At this point, the left chest cavity was 
irrigated with copious amounts of normal saline and careful hemostasis was 
obtained.  Local anesthesia consisting of 0.25% Marcaine had been infiltrated 
in the left and right breast and approximately 10 cc was also left in the chest 
wall cavity.  A #10 LAURA drain was also placed on the left side to the inferior 
flap.  Given the dog ears that were created by the incision on both sides, the 
dog ears were excised using electrocautery and a knife.  After this was done, 
the incision was closed in layers, the first layer being the subdermal layer 
which was closed using interrupted 3-0 Vicryl sutures and the skin was closed 
using a running 4-0 Monocryl suture.  At the end of the case, sterile dressings 
were then placed.  An Ace bandage was also placed around the chest and then the 
patient's anesthesia was reversed, her Munson catheter was removed and she was 
taken to the PACU in stable condition.  At the end of the case, all counts were 
correct and I was present through the entirety of the case.

 

 [id number]/[facility] #: [id number]

MTDD

## 2019-08-01 NOTE — OP
Operative Report - Detailed





- Operation Details


Date of Operation: 08/01/19


Surgeon(s): Florencia Orantes


Assistant(s): Jaspreet Shah


Anesthesiologist(s): Angelo Red


Anesthesia: GETA


Pre-Op Diagnosis: right breast cancer


Post-Op Diagnosis: right breast cancer


Planned Operative Procedure(s): bilateral mastectomies, bilateral sentinal 

lymph node biopsies, removal of port


Estimated Blood Loss: 50 cc


Specimen(s)/Culture(s) Description: right, left breasts, Right SLN x 4, Left 

SLN x 3, port


Drains: LAURA x 2


Wound Classification: Clean


Complications: None


Findings: Port in place, no palpable breast masses, sentinal lymph nodes x 4 on 

right, x3 on left

## 2019-08-02 VITALS — SYSTOLIC BLOOD PRESSURE: 116 MMHG | DIASTOLIC BLOOD PRESSURE: 69 MMHG

## 2019-08-02 NOTE — PN
Progress Note





- Progress Note


Date of Service: 08/02/19


SOAP: 


Subjective:


This is a 59 y.o. female 1 day s/p bilateral mastectomy with sentinel node 

dissection. The patients states she is feeling well this morning and denies any 

pain, dizziness, nausea or vomiting. She admits some itching around the 

incision site that began post-op. She reports that her face, hands, and feet 

look and feel swollen, but that the swelling has been improving since 

yesterday. She is able to ambulate with minimal assistance, is tolerating 

liquids, and has voided and stooled normally. She reports that she felt 

"nauseous in the head" yesterday but denied any headache and reports that she 

feels better this morning. She is pleasant, talkative, and is feeling 

relatively at peace with the implications of surgery. She states that she 

required no pain medication overnight and continues to deny the need for pain 

meds. She expresses interest in going home as soon as possible.








Objective:


 Vital Signs - 8 hr











  08/02/19 08/02/19





  03:30 07:29


 


Temperature  98.4 F


 


Pulse Rate  65


 


Respiratory  16





Rate  


 


Blood Pressure  128/64





(mmHg)  


 


O2 Sat by Pulse 100 100





Oximetry  





Intake and Output Last 24 Hours











 07/31/19 08/01/19 08/02/19 08/03/19





 06:59 06:59 06:59 06:59


 


Intake Total   2900 


 


Output Total   2107 


 


Balance   793 


 


Weight   200 lb 


 


Intake:    


 


  IV Fluids   2200 


 


    LR   2200 


 


  Oral   700 


 


Output:    


 


  LAURA #1   52 


 


  LAURA #2   55 


 


  Urine   1600 


 


  Munson   300 


 


  Estimated Blood Loss   100 


 


Other:    


 


  Date of Last Bowel   8/1/19 





  Movement    


 


  # Bowel Movements   1 


 


  Estimated Stool Amount   Medium 








Physical Exam: 


General: no-toxic, not in acute distress, A&O x3


Cardiovascular: RRR, no murmurs, rubs, or gallops


Lungs: clear to auscultation 


Extremities: peripheral pulses 3+ on UE and LE, no pitting edema or 

discoloration on LE


Abdomen: soft, non-tender, no rigidity or guarding 





LAURA drains: 75 cc right, 50 cc Left 








Assessment:


59 y.o. female s/p bilateral mastectomy with sentinel node dissection 








Plan:


Continue monitoring pain, LAURA drainage, vitals and labs. Progress diet as 

tolerated. Continue ambulation as tolerated with assistance. Continue SCD use 

and Heparin for DT/PE prophylaxis.

## 2019-09-19 ENCOUNTER — HOSPITAL ENCOUNTER (OUTPATIENT)
Dept: HOSPITAL 25 - OR | Age: 59
Discharge: HOME | End: 2019-09-19
Attending: OBSTETRICS & GYNECOLOGY
Payer: COMMERCIAL

## 2019-09-19 VITALS — SYSTOLIC BLOOD PRESSURE: 143 MMHG | DIASTOLIC BLOOD PRESSURE: 81 MMHG

## 2019-09-19 DIAGNOSIS — Z85.3: ICD-10-CM

## 2019-09-19 DIAGNOSIS — Z40.02: Primary | ICD-10-CM

## 2019-09-19 DIAGNOSIS — Z14.8: ICD-10-CM

## 2019-09-19 DIAGNOSIS — Z87.891: ICD-10-CM

## 2019-09-19 PROCEDURE — 36415 COLL VENOUS BLD VENIPUNCTURE: CPT

## 2019-09-19 PROCEDURE — 86900 BLOOD TYPING SEROLOGIC ABO: CPT

## 2019-09-19 PROCEDURE — 86850 RBC ANTIBODY SCREEN: CPT

## 2019-09-19 PROCEDURE — 86901 BLOOD TYPING SEROLOGIC RH(D): CPT

## 2019-09-19 PROCEDURE — 88307 TISSUE EXAM BY PATHOLOGIST: CPT

## 2019-09-20 NOTE — OP
DATE OF OPERATION:  09/19/19 - \[facility]\""

 

DATE OF BIRTH:  [birth year]

 

SURGEON:  Bernadette Sagastume MD

 

ASSISTANT:  Tom Palumbo MD

 

ANESTHESIOLOGIST:  Dr. Lennon.

 

ANESTHESIA:  General endotracheal with local at incision site.

 

PRE-OP DIAGNOSIS:  BRCA gene mutation carrier, history of breast cancer.

 

POST-OP DIAGNOSIS:  BRCA gene mutation carrier, history of breast cancer.

 

OPERATIVE PROCEDURE:  Laparoscopic bilateral salpingo-oophorectomy.

 

ESTIMATED BLOOD LOSS:  Minimal.

 

URINE OUTPUT:  200 cc of clear yellow urine.

 

FLUIDS:  1100 cc of crystalloid.

 

FINDINGS:  Revealed normal-appearing tubes and ovaries bilaterally, normal- 
appearing uterus, normal-appearing appendix, normal-appearing bowel, normal- 
appearing liver edge, normal-appearing omentum.

 

COMPLICATIONS:  None apparent.

 

DISPOSITION:  Stable to recovery room.

 

DESCRIPTION OF PROCEDURE:  The patient was placed in dorsal lithotomy position. 
The abdomen and perineum were prepped and draped in a sterile standard fashion 
after legs were placed in  Universal Juwan stirrups.  After completion of 
prep and drape, the patient was identified with universal protocol for correct 
procedure, position, and patient.  Self cath was placed for drainage of clear 
yellow urine 200 cc, self cath was removed.  Sterile speculum was inserted.  
Cervix was visualized, grasped on the anterior lip with a single-tooth 
tenaculum.  A Hulka clamp was then placed.  Single-tooth tenaculum removed, 
sterile speculum removed.  Attention was then focused on the abdominal portion 
of the case.  An 8 cc of 0.5% Marcaine with epi was infused at the umbilicus.  
An incision was made with scalpel, #11 blade in a vertical fashion.  The fascia 
was then grasped with Kocher, incised with scalpel.  The peritoneum was then 
entered bluntly.  A 10 mm Rosie port was then placed and insufflated.  The 
scope confirmed excellent position and peritoneum was then created.  Two side 
ports were then placed under direct visualization after numbing the site with 
0.5% Marcaine with epi.  The incision was made with #11 scalpel blade and 5 mm 
blunt trocar and trocar sheath were inserted under direct visualization both on 
the right and the left, inferior to the umbilical port site. Both tubes and 
ovaries were noted to have a normal appearance.  The left ovary and tube were 
approached first, grasped with a blunt grasper.  Using LigaSure serially along 
the IP ligament and then ovarian ligament.  This was then excised.  This 
process was repeated with LigaSure on the right for the complete removal of 
both right and left ovaries and fallopian tubes.  At that point, the scopes 
were changed to 5 mm scope on the side port.  A 10 mm port was used for the 
Endo bag.  The ovaries and fallopian tubes were placed in the EndoCatch bag, 
EndoCatch bag was then removed.  Confirming removal of both tubes and ovaries 
from the right and left.  Hemostasis was then assured.  The trocars were then 
removed under direct visualization.  Pneumoperitoneum was released and the 
fascia itself was reapproximated using 0 Monocryl for complete reapproximation 
of the umbilical port site at the fascia.  The skin was then reapproximated 
using 4-0 Monocryl in a subcuticular fashion at all 3 port sites.  Mastisol and 
Steri's were applied. Hulka clamp was removed.  The patient was returned to 
dorsal supine position and taken to recovery room in stable condition.

 

 [id number]/[facility] #: [id number]

TIERRA